# Patient Record
Sex: MALE | Race: ASIAN | NOT HISPANIC OR LATINO
[De-identification: names, ages, dates, MRNs, and addresses within clinical notes are randomized per-mention and may not be internally consistent; named-entity substitution may affect disease eponyms.]

---

## 2019-08-21 PROBLEM — Z00.00 ENCOUNTER FOR PREVENTIVE HEALTH EXAMINATION: Status: ACTIVE | Noted: 2019-08-21

## 2019-08-26 ENCOUNTER — APPOINTMENT (OUTPATIENT)
Dept: ORTHOPEDIC SURGERY | Facility: CLINIC | Age: 29
End: 2019-08-26

## 2020-01-23 ENCOUNTER — EMERGENCY (EMERGENCY)
Facility: HOSPITAL | Age: 30
LOS: 1 days | Discharge: HOME | End: 2020-01-23
Attending: EMERGENCY MEDICINE
Payer: MEDICAID

## 2020-01-23 VITALS
OXYGEN SATURATION: 99 % | HEART RATE: 51 BPM | TEMPERATURE: 98 F | SYSTOLIC BLOOD PRESSURE: 120 MMHG | RESPIRATION RATE: 18 BRPM | DIASTOLIC BLOOD PRESSURE: 75 MMHG

## 2020-01-23 VITALS
OXYGEN SATURATION: 99 % | RESPIRATION RATE: 18 BRPM | DIASTOLIC BLOOD PRESSURE: 63 MMHG | TEMPERATURE: 97 F | WEIGHT: 164.91 LBS | SYSTOLIC BLOOD PRESSURE: 103 MMHG | HEIGHT: 68 IN | HEART RATE: 55 BPM

## 2020-01-23 LAB
ALBUMIN SERPL ELPH-MCNC: 4.1 G/DL — SIGNIFICANT CHANGE UP (ref 3.3–5)
ALP SERPL-CCNC: 64 U/L — SIGNIFICANT CHANGE UP (ref 40–120)
ALT FLD-CCNC: 15 U/L — SIGNIFICANT CHANGE UP (ref 10–45)
ANION GAP SERPL CALC-SCNC: 12 MMOL/L — SIGNIFICANT CHANGE UP (ref 5–17)
AST SERPL-CCNC: 16 U/L — SIGNIFICANT CHANGE UP (ref 10–40)
BASOPHILS # BLD AUTO: 0.04 K/UL — SIGNIFICANT CHANGE UP (ref 0–0.2)
BASOPHILS NFR BLD AUTO: 0.7 % — SIGNIFICANT CHANGE UP (ref 0–2)
BILIRUB SERPL-MCNC: 0.6 MG/DL — SIGNIFICANT CHANGE UP (ref 0.2–1.2)
BUN SERPL-MCNC: 6 MG/DL — LOW (ref 7–23)
CALCIUM SERPL-MCNC: 9.6 MG/DL — SIGNIFICANT CHANGE UP (ref 8.4–10.5)
CHLORIDE SERPL-SCNC: 102 MMOL/L — SIGNIFICANT CHANGE UP (ref 96–108)
CO2 SERPL-SCNC: 24 MMOL/L — SIGNIFICANT CHANGE UP (ref 22–31)
CREAT SERPL-MCNC: 0.9 MG/DL — SIGNIFICANT CHANGE UP (ref 0.5–1.3)
EOSINOPHIL # BLD AUTO: 0.08 K/UL — SIGNIFICANT CHANGE UP (ref 0–0.5)
EOSINOPHIL NFR BLD AUTO: 1.3 % — SIGNIFICANT CHANGE UP (ref 0–6)
GLUCOSE SERPL-MCNC: 90 MG/DL — SIGNIFICANT CHANGE UP (ref 70–99)
HCT VFR BLD CALC: 38.8 % — LOW (ref 39–50)
HGB BLD-MCNC: 12.9 G/DL — LOW (ref 13–17)
IMM GRANULOCYTES NFR BLD AUTO: 0.2 % — SIGNIFICANT CHANGE UP (ref 0–1.5)
LIDOCAIN IGE QN: 19 U/L — SIGNIFICANT CHANGE UP (ref 7–60)
LYMPHOCYTES # BLD AUTO: 1.99 K/UL — SIGNIFICANT CHANGE UP (ref 1–3.3)
LYMPHOCYTES # BLD AUTO: 33.1 % — SIGNIFICANT CHANGE UP (ref 13–44)
MCHC RBC-ENTMCNC: 29.2 PG — SIGNIFICANT CHANGE UP (ref 27–34)
MCHC RBC-ENTMCNC: 33.2 GM/DL — SIGNIFICANT CHANGE UP (ref 32–36)
MCV RBC AUTO: 87.8 FL — SIGNIFICANT CHANGE UP (ref 80–100)
MONOCYTES # BLD AUTO: 0.51 K/UL — SIGNIFICANT CHANGE UP (ref 0–0.9)
MONOCYTES NFR BLD AUTO: 8.5 % — SIGNIFICANT CHANGE UP (ref 2–14)
NEUTROPHILS # BLD AUTO: 3.39 K/UL — SIGNIFICANT CHANGE UP (ref 1.8–7.4)
NEUTROPHILS NFR BLD AUTO: 56.2 % — SIGNIFICANT CHANGE UP (ref 43–77)
NRBC # BLD: 0 /100 WBCS — SIGNIFICANT CHANGE UP (ref 0–0)
PLATELET # BLD AUTO: 190 K/UL — SIGNIFICANT CHANGE UP (ref 150–400)
POTASSIUM SERPL-MCNC: 3.6 MMOL/L — SIGNIFICANT CHANGE UP (ref 3.5–5.3)
POTASSIUM SERPL-SCNC: 3.6 MMOL/L — SIGNIFICANT CHANGE UP (ref 3.5–5.3)
PROT SERPL-MCNC: 6.5 G/DL — SIGNIFICANT CHANGE UP (ref 6–8.3)
RBC # BLD: 4.42 M/UL — SIGNIFICANT CHANGE UP (ref 4.2–5.8)
RBC # FLD: 11.9 % — SIGNIFICANT CHANGE UP (ref 10.3–14.5)
SODIUM SERPL-SCNC: 138 MMOL/L — SIGNIFICANT CHANGE UP (ref 135–145)
WBC # BLD: 6.02 K/UL — SIGNIFICANT CHANGE UP (ref 3.8–10.5)
WBC # FLD AUTO: 6.02 K/UL — SIGNIFICANT CHANGE UP (ref 3.8–10.5)

## 2020-01-23 PROCEDURE — 85027 COMPLETE CBC AUTOMATED: CPT

## 2020-01-23 PROCEDURE — 83690 ASSAY OF LIPASE: CPT

## 2020-01-23 PROCEDURE — 99284 EMERGENCY DEPT VISIT MOD MDM: CPT

## 2020-01-23 PROCEDURE — 99283 EMERGENCY DEPT VISIT LOW MDM: CPT

## 2020-01-23 PROCEDURE — 80053 COMPREHEN METABOLIC PANEL: CPT

## 2020-01-23 PROCEDURE — 71046 X-RAY EXAM CHEST 2 VIEWS: CPT | Mod: 26

## 2020-01-23 PROCEDURE — 71046 X-RAY EXAM CHEST 2 VIEWS: CPT

## 2020-01-23 RX ORDER — IBUPROFEN 200 MG
600 TABLET ORAL ONCE
Refills: 0 | Status: COMPLETED | OUTPATIENT
Start: 2020-01-23 | End: 2020-01-23

## 2020-01-23 RX ADMIN — Medication 600 MILLIGRAM(S): at 18:28

## 2020-01-23 NOTE — ED PROVIDER NOTE - PHYSICAL EXAMINATION
PGY2/MD Red.   VITALS: reviewed  GEN: No apparent distress, A & O x 4  HEAD/EYES: NC/AT, PERRL, EOMI, anicteric sclerae, no conjunctival pallor  ENT: mucus membranes moist, oropharynx WNL, trachea midline, no JVD, neck is supple  RESP: lungs CTA with equal breath sounds bilaterally, chest wall nontender and atraumatic + locak tenderness BILATERAL lower chest, around rib 7-8th  CV: heart with reg rhythm S1, S2, no murmur; distal pulses intact and symmetric bilaterally  ABDOMEN: normoactive bowel sounds, soft, nondistended, nontender, no palpable masses  : no CVAT  MSK: extremities atraumatic and nontender, no edema, no asymmetry.   SKIN: warm, dry, no rash, no bruising, no cyanosis. color appropriate for ethnicity  NEURO: alert, mentating appropriately, no facial asymmetry. gross sensation, motor, coordination are intact  PSYCH: Affect appropriate

## 2020-01-23 NOTE — ED ADULT NURSE NOTE - CHPI ED NUR SYMPTOMS NEG
no blood in stool/no burning urination/no diarrhea/no fever/no dysuria/no hematuria/no abdominal distension/no chills/no nausea/no vomiting

## 2020-01-23 NOTE — ED PROVIDER NOTE - CHPI ED SYMPTOMS NEG
no blood in stool/no diarrhea/no chills/no fever/no vomiting/no dark stools, no CP no SOB no penile discharge

## 2020-01-23 NOTE — ED PROVIDER NOTE - NSFOLLOWUPINSTRUCTIONS_ED_ALL_ED_FT
Thank you for visiting our Emergency Department, it has been a pleasure taking part in your healthcare.    You had a thorough evaluation including an exam, labs and imaging. You were given medications for comfort. Your workup did not demonstrate any concerning findings. This does not mean that your symptoms are not real, only that we were unable to find a dangerous or life-threatening cause. Please read the attached information sheets as they will provide useful information regarding your condition.    Your discharge diagnosis is: Abdominal pain  Return precautions to the Emergency Department include but are not limited to: worsening pain despite medications, persistent nausea/vomiting (can't keep water down at all) fevers, unrelenting nausea, vomiting, shortness of breath, dizziness, chest or abdominal pain, worsening back pain, syncope, blood in urine, stool, or vomit, headache that doesn't resolve, numbness or tingling, loss of sensation, loss of motor function, or any other concerning symptoms.    1) Follow up with your primary care doctor within 48 hours. Please call [phone] to make an appointment or with any questions you may have.  or call [phone] to make an appointment with the clinic  2) You should also establish care with Gastroenterology by calling  [phone] to find a doctor affiliated with [facility] in your neighborhood & network.   3) Take over the counter Pepcid 20 mg every 12 hrs. Take maalox 2 tablespoons (30ml) as needed for epigastric discomfort. Take simethicone (GasX) every 6 hours as needed for gas pain, cramping, burping or flatulance.  4) Drink at least 2 Liters or 64 Ounces of water each day.  5) Read all attached.

## 2020-01-23 NOTE — ED PROVIDER NOTE - NSFOLLOWUPCLINICS_GEN_ALL_ED_FT
[facility]  Gastroenterology  [address] [place] [id number]  Phone: [phone]  Fax:   Follow Up Time: 7-10 Days

## 2020-01-23 NOTE — ED PROVIDER NOTE - ATTENDING CONTRIBUTION TO CARE
29M presenting to the ED w/ acute on chronic abdominal pain, diffuse x 3 months, but worsening over the last 10 days. Patient states burning sensation, denies triggering factors. Patient states that he saw his PMD who performed a UA and blood work which showed ?hepatitis. Has not yet seen a hepatologist. Patient went for a US but is still waiting for results. Denies taking anything for pain. Agree with rest of HPI as above.    PHYSICAL EXAMINATION:  VITALS REVIEWED.  VS normal  GENERALIZED APPEARANCE:  Comfortable, no acute distress, ambulating without difficulty  SKIN:  Warm, dry, no cyanosis  HEAD:  No obvious scalp lesions  EYES:  Conjunctiva pink, no icterus  ENMT:  Mucus membranes moist, no stridor  NECK:  Supple, non-tender  CHEST AND RESPIRATORY:  Clear to auscultation B/L, good air entry B/L, equal chest expansion  HEART AND CARDIOVASCULAR:  Regular rate, no obvious murmur  ABDOMEN AND GI:  Soft, non-tender, non-distended.  No rebound, no guarding, no CVA-area tenderness  EXTREMITIES:  No deformity, edema, or calf tenderness  NEURO: AAOx3, gross motor and sensory intact  PSYCH: Normal affect     Impression:  ?Gastritis vs. r/o pancreatitis vs. other etiology  Labs, imaging, pain management PRN, re-eval

## 2020-01-23 NOTE — ED PROVIDER NOTE - OBJECTIVE STATEMENT
29 year old M with no significant pmhx and pshx of nasal reconstruction presents to ED with acute on chronic abd pain. Pt reports constant waxing and waning diffuse abd pain x3 months, but pain has worsened within the last x10 days. Describes pain as burning sensation, notes no triggering factors. Saw his PMD last week who performed UA and blood work which showed hepatitis per pt. Pt went for US, but is still waiting for results. Pt reports he's lost 40lbs within the last 2 months. He also notes he's had dark colored urine. Has not taken pain meds  Denies fever, chills, vomiting, diarrhea, dark stools, bloody stools, CP, SOB, penile discharge. No recent Abx use. No recent travel. Denies drug, tobacco, and alcohol use. Not sexually active. 29 year old M with no significant pmhx and pshx of nasal reconstruction presents to ED with acute on chronic abd pain. Pt reports constant waxing and waning diffuse abd pain x3 months, but pain has worsened within the last x10 days. Describes pain as burning sensation, notes no triggering factors. Saw his PMD last week who performed UA and blood work which showed hepatitis per pt. Has not yet seen hepatologist. Pt went for US, but is still waiting for results. Pt reports he's lost 40lbs within the last 2 months. He also notes he's had dark colored urine. Has not taken pain meds  Denies fever, chills, vomiting, diarrhea, dark stools, bloody stools, CP, SOB, penile discharge. No recent Abx use. No recent travel. Denies drug, tobacco, and alcohol use. Not sexually active. 29 year old M with no significant pmhx and pshx of nasal reconstruction presents to ED with acute on chronic abd pain. Pt reports constant waxing and waning diffuse abd pain x3 months, but pain has worsened within the last x10 days. Describes pain as burning sensation, notes no triggering factors. Saw his PMD last week who performed UA and blood work which showed hepatitis per pt. Has not yet seen hepatologist. Pt went for US, but is still waiting for results. Pt reports he's lost 40lbs within the last 2 months. He also notes he's had dark colored urine. Has not taken pain meds  Denies fever, chills, vomiting, diarrhea, dark stools, bloody stools, CP, SOB, penile discharge. No recent Abx use. No recent travel. Denies drug, tobacco, and alcohol use. Not currently sexually active, last active 2 years ago.

## 2020-01-23 NOTE — ED PROVIDER NOTE - PROGRESS NOTE DETAILS
PGY2/MD Red. No transaminases, no acute findings from chest xray. I have given the pt strict return and follow up precautions. The patient has been provided with a copy of all pertinent results. The patient has been informed of all concerning signs and symptoms to return to Emergency Department, the necessity to follow up with PMD/Clinic/follow up provided within 2-3 days was explained, and the patient reports understanding of above with capacity and insight. Patient re-evaluated. Labs and imaging reviewed. Labs unremarkable, imaging unremarkable. Patient reports feeling better, wants to go home. Normal transaminases. Patient understands to follow up with his PMD.  All labs and imaging results (if any), were reviewed with the patient. Patient understands to follow up with their regular doctor. Patient understands to return to the ED is symptoms worsen or progress. Discharge instructions were given to the patient and discussed with patient. Rx (if any) were electronically sent to patient's preferred pharmacy.

## 2020-01-23 NOTE — ED PROVIDER NOTE - CLINICAL SUMMARY MEDICAL DECISION MAKING FREE TEXT BOX
PGY2/MD Red. 30 yo M, p/w 3m broad abd pain, more pointing to b/l lower chest, not pleuritic, continuous pain over the 3 mo, not likely cardio genic or PE or dissection given his long/chronic complaints and no risk factors. hepatitis? pt was told, will ge cmp to check transaminations.

## 2020-01-23 NOTE — ED PROVIDER NOTE - PATIENT PORTAL LINK FT
You can access the FollowMyHealth Patient Portal offered by [facility] by registering at the following website: [email or web address] By joining [email or web address] FollowMyHealth portal, you will also be able to view your health information using other applications (apps) compatible with our system.

## 2020-01-23 NOTE — ED ADULT NURSE NOTE - OBJECTIVE STATEMENT
c/o generalized abdominal pain x 2 months. Pt States "my stomach hurts all the time". Pt denies headache, blurred vision, lightheadedness, dizziness, difficulty swallowing, decreased appetite, chest pain, sob, N/V/D, urinary symptoms, change in bowel habits. Pt endorses generalized abdominal pain x 2 months.  Nondistended, flat abdomen, tender at upper quadrants bilaterally at present. Pain 8/10. c/o generalized abdominal pain x 2 months. Pt States "my stomach hurts all the time". Pt denies headache, blurred vision, lightheadedness, dizziness, difficulty swallowing, decreased appetite, chest pain, sob, N/V/D, urinary symptoms, change in bowel habits. abd. nondistended, flat, and tender bilateral RUQ/LUQ at present. Pain 8/10.

## 2020-10-21 PROBLEM — Z78.9 OTHER SPECIFIED HEALTH STATUS: Chronic | Status: ACTIVE | Noted: 2020-01-23

## 2020-10-23 ENCOUNTER — APPOINTMENT (OUTPATIENT)
Dept: CT IMAGING | Facility: IMAGING CENTER | Age: 30
End: 2020-10-23
Payer: MEDICAID

## 2020-10-23 ENCOUNTER — OUTPATIENT (OUTPATIENT)
Dept: OUTPATIENT SERVICES | Facility: HOSPITAL | Age: 30
LOS: 1 days | End: 2020-10-23
Payer: MEDICAID

## 2020-10-23 DIAGNOSIS — R10.9 UNSPECIFIED ABDOMINAL PAIN: ICD-10-CM

## 2020-10-23 PROCEDURE — 74177 CT ABD & PELVIS W/CONTRAST: CPT | Mod: 26

## 2020-10-23 PROCEDURE — 74177 CT ABD & PELVIS W/CONTRAST: CPT

## 2023-07-10 NOTE — ED PROVIDER NOTE - NS ED SCRIBE STATEMENT
Resident Topical Clindamycin Counseling: Patient counseled that this medication may cause skin irritation or allergic reactions.  In the event of skin irritation, the patient was advised to reduce the amount of the drug applied or use it less frequently.   The patient verbalized understanding of the proper use and possible adverse effects of clindamycin.  All of the patient's questions and concerns were addressed.